# Patient Record
Sex: FEMALE | Race: BLACK OR AFRICAN AMERICAN | NOT HISPANIC OR LATINO | Employment: FULL TIME | URBAN - METROPOLITAN AREA
[De-identification: names, ages, dates, MRNs, and addresses within clinical notes are randomized per-mention and may not be internally consistent; named-entity substitution may affect disease eponyms.]

---
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
## 2024-07-22 ENCOUNTER — OFFICE VISIT (OUTPATIENT)
Dept: FAMILY MEDICINE | Facility: CLINIC | Age: 24
End: 2024-07-22
Payer: MEDICAID

## 2024-07-22 ENCOUNTER — LAB VISIT (OUTPATIENT)
Dept: LAB | Facility: HOSPITAL | Age: 24
End: 2024-07-22
Attending: STUDENT IN AN ORGANIZED HEALTH CARE EDUCATION/TRAINING PROGRAM
Payer: MEDICAID

## 2024-07-22 VITALS
WEIGHT: 113.63 LBS | SYSTOLIC BLOOD PRESSURE: 101 MMHG | DIASTOLIC BLOOD PRESSURE: 61 MMHG | BODY MASS INDEX: 19.4 KG/M2 | OXYGEN SATURATION: 100 % | HEART RATE: 99 BPM | HEIGHT: 64 IN | RESPIRATION RATE: 18 BRPM
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
## 2024-07-22 DIAGNOSIS — S70.261A TICK BITE OF RIGHT HIP, INITIAL ENCOUNTER: Primary | ICD-10-CM

## 2024-07-22 DIAGNOSIS — S70.261A TICK BITE OF RIGHT HIP, INITIAL ENCOUNTER: ICD-10-CM

## 2024-07-22 PROCEDURE — 99203 OFFICE O/P NEW LOW 30 MIN: CPT | Mod: PBBFAC,PO | Performed by: STUDENT IN AN ORGANIZED HEALTH CARE EDUCATION/TRAINING PROGRAM

## 2024-07-22 PROCEDURE — 99203 OFFICE O/P NEW LOW 30 MIN: CPT | Mod: S$PBB,,, | Performed by: STUDENT IN AN ORGANIZED HEALTH CARE EDUCATION/TRAINING PROGRAM

## 2024-07-22 PROCEDURE — 99999 PR PBB SHADOW E&M-NEW PATIENT-LVL III: CPT | Mod: PBBFAC,,, | Performed by: STUDENT IN AN ORGANIZED HEALTH CARE EDUCATION/TRAINING PROGRAM

## 2024-07-22 PROCEDURE — 86618 LYME DISEASE ANTIBODY: CPT | Performed by: STUDENT IN AN ORGANIZED HEALTH CARE EDUCATION/TRAINING PROGRAM

## 2024-07-22 PROCEDURE — 36415 COLL VENOUS BLD VENIPUNCTURE: CPT | Mod: PO | Performed by: STUDENT IN AN ORGANIZED HEALTH CARE EDUCATION/TRAINING PROGRAM

## 2024-07-22 NOTE — PROGRESS NOTES
"Same day clinic    Kerri Poon is a 24 y.o. year old Black or  female  has a past medical history of Anxiety, Migraine, and Sickle cell trait.. Pt presented to the clinic for the concerns of tick bite. She had tick bite 2 weeks ago in [place] when she was cleaning the house.  It stayed on the body for <7 hours. No rash, fever, arthralgia, neurological deficit.  Patient showed with a picture of tick and it was fully removed by her    ROS: No fever, chills, cough, chest pain, shortness of breath, nausea, vomiting or diarrhea.      Vitals:    07/22/24 1553   BP: 101/61   Pulse: 99   Resp: 18   SpO2: 100%   Weight: 51.5 kg (113 lb 10.4 oz)   Height: 5' 4" (1.626 m)       Body mass index is 19.51 kg/m².     PE:  General - Well developed, alert and oriented in NAD  HEENT - normocephalic, no evidence of trauma, sclera white, EOMI  Neck - full range of motion  COR - regular rate and rhythm without murmurs or gallops  Lungs - Clear  Abdomen - soft, non-tender  Ext - no cyanosis or edema    No results found for: "WBC", "HGB", "HCT", "MCV", "MCH", "MCHC", "RDW", "PLT", "MPV", "NEUTROABS", "LYMPHOABS", "MONOABS", "EOSINOABS", "BASOSABS", "NEUTROPCT", "LYMPHOPCT", "MONOPCT", "EOSINOPCT", "BASOPCT", "DIFFTYPE", "RBCMORPHOLOG", "PLTEST"    No results found for: "NA", "K", "CL", "CO2", "GLU", "BUN", "LABCREA", "CALCIUM", "PROT", "ALBUMIN", "BILITOT", "AST", "ALKPHOS", "ALT", "GFRAA", "GFRNONAA"    No results found for: "TRIG", "CHOL", "HDL", "LDLCALC", "CHOLHDL", "NONHDLC"      No results found for: "LABA1C", "HGBA1C"    No results found for: "MICROALBUR", "[id number]"      Impression:  1. Tick bite of right hip, initial encounter  LYME DISEASE ANTIBODY BY EIA           Plan:  Discussed with the patient that there is low concern for Lyme disease.  Patient did not have any typical symptoms and it is not highly endemic in [place].  Follow up on Lyme disease serology    Orders Placed This Encounter   Procedures    " LYME DISEASE ANTIBODY BY EIA       No follow-ups on file.     Ramona Lang MD     I spent a total of 35 minutes on the day of the visit.This includes face to face time and non-face to face time preparing to see the patient (eg, review of tests), obtaining and/or reviewing separately obtained history, documenting clinical information in the electronic or other health record, independently interpreting results and communicating results to the patient/family/caregiver, or care coordinator.

## 2024-07-25 ENCOUNTER — TELEPHONE (OUTPATIENT)
Dept: FAMILY MEDICINE | Facility: CLINIC | Age: 24
End: 2024-07-25
Payer: MEDICAID

## 2024-07-25 LAB — B BURGDOR AB SER IA-ACNC: 0.1 INDEX VALUE
